# Patient Record
Sex: FEMALE | Race: BLACK OR AFRICAN AMERICAN | NOT HISPANIC OR LATINO | URBAN - METROPOLITAN AREA
[De-identification: names, ages, dates, MRNs, and addresses within clinical notes are randomized per-mention and may not be internally consistent; named-entity substitution may affect disease eponyms.]

---

## 2021-03-07 ENCOUNTER — OFFICE VISIT (OUTPATIENT)
Dept: URGENT CARE | Age: 56
End: 2021-03-07
Payer: COMMERCIAL

## 2021-03-07 VITALS — TEMPERATURE: 95.8 F | HEART RATE: 108 BPM | OXYGEN SATURATION: 97 %

## 2021-03-07 DIAGNOSIS — Z20.822 CONTACT WITH AND (SUSPECTED) EXPOSURE TO COVID-19: Primary | ICD-10-CM

## 2021-03-07 PROCEDURE — 99203 OFFICE O/P NEW LOW 30 MIN: CPT | Performed by: NURSE PRACTITIONER

## 2021-03-07 RX ORDER — AMLODIPINE BESYLATE 5 MG/1
5 TABLET ORAL DAILY
COMMUNITY
Start: 2021-02-20

## 2021-03-07 NOTE — PROGRESS NOTES
[facility]        NAME: Dayanara Hull is a 63 y o  female  : 1965    MRN: [id number]  DATE: 2021  TIME: 4:22 PM    Assessment and Plan   Contact with and (suspected) exposure to covid-19 [Z20 822]  1  Contact with and (suspected) exposure to covid-19           Patient Instructions     Advised to wait another 4 days before being tested to avoid false negative  Discuss this with your PCP is [place]  Stay quarantined until after testing   Verbalized understanding  Follow up with PCP in 4-5 days  Proceed to  ER if symptoms develop    Chief Complaint     Chief Complaint   Patient presents with    COVID-19     exposed last night, denies s/s , pt aware of possible too soon for testing  History of Present Illness       HPI   Reports she was at a family function yesterday and one person who was also at the function called her today and reported a positive covid test  No current symptoms  Requesting to be evaluated and requesting a covid 19 test  States she is going back to [place] tomorrow morning    Review of Systems   Review of Systems   Constitutional: Negative for chills and fever  HENT: Negative for sore throat and trouble swallowing  Respiratory: Negative for cough, shortness of breath and wheezing  Cardiovascular: Negative for chest pain  Gastrointestinal: Negative for diarrhea, nausea and vomiting  Neurological: Negative for dizziness and headaches           Current Medications       Current Outpatient Medications:     amLODIPine (NORVASC) 5 mg tablet, Take 5 mg by mouth daily, Disp: , Rfl:     Current Allergies     Allergies as of 2021    (No Known Allergies)            The following portions of the patient's history were reviewed and updated as appropriate: allergies, current medications, past family history, past medical history, past social history, past surgical history and problem list      Past Medical History:   Diagnosis Date    Hypertension No past surgical history on file  No family history on file  Medications have been verified  Objective   Pulse (!) 108   Temp (!) 95 8 °F (35 4 °C)   SpO2 97%   No LMP recorded  Physical Exam     Physical Exam  Constitutional:       Appearance: She is obese  She is not ill-appearing or diaphoretic  HENT:      Right Ear: Tympanic membrane and ear canal normal       Left Ear: Tympanic membrane and ear canal normal       Nose: No rhinorrhea  Mouth/Throat:      Pharynx: No posterior oropharyngeal erythema  Cardiovascular:      Rate and Rhythm: Regular rhythm  Heart sounds: Normal heart sounds  Pulmonary:      Effort: Pulmonary effort is normal       Breath sounds: Normal breath sounds  Lymphadenopathy:      Cervical: No cervical adenopathy  Neurological:      Mental Status: She is alert